# Patient Record
Sex: FEMALE | Race: WHITE | URBAN - NONMETROPOLITAN AREA
[De-identification: names, ages, dates, MRNs, and addresses within clinical notes are randomized per-mention and may not be internally consistent; named-entity substitution may affect disease eponyms.]

---

## 2020-05-29 ENCOUNTER — IMPORTED ENCOUNTER (OUTPATIENT)
Dept: URBAN - NONMETROPOLITAN AREA CLINIC 1 | Facility: CLINIC | Age: 45
End: 2020-05-29

## 2020-05-29 PROBLEM — H25.13: Noted: 2020-05-29

## 2020-05-29 PROBLEM — H35.3130: Noted: 2020-05-29

## 2020-05-29 PROBLEM — E11.9: Noted: 2020-05-29

## 2020-05-29 PROBLEM — Z01.01: Noted: 2020-05-29

## 2020-05-29 PROBLEM — H52.13: Noted: 2020-05-29

## 2020-05-29 PROCEDURE — 92015 DETERMINE REFRACTIVE STATE: CPT

## 2020-05-29 PROCEDURE — 92004 COMPRE OPH EXAM NEW PT 1/>: CPT

## 2020-05-29 NOTE — PATIENT DISCUSSION
Myopia OU Discussed w/ patient DM s DR-Stressed the importance of keeping blood sugars under control blood pressure under control and weight normalization and regular visits with PCP. -Explained the possible effects of poorly controlled diabetes and the damage that diabetes can cause to ocular health. - Pt states that she does not check BS daily - Pt states that blood work was completed day before this appt unsure of results of A1C level. -Patient to check HgbA1C.-Pt instructed to contact our office with any vision changes. AMD - dry OU -Explained dry AMD and advised that there are no treatments available at this time.-Continue AREDS 2 MVT. - Order OCT MAC for next visit -Continue Amsler grid monitoring daily. Pt is to contact our office if any changes are noted. Cataract OU-Not yet surgical. -Reviewed symptoms of advancing cataract growth such as glare and halos and decreased vision.-Continue to monitor for now. Pt will notify us if any new symptoms develop. RTC 1 year CEE w/ OCT MAC

## 2021-09-28 ENCOUNTER — IMPORTED ENCOUNTER (OUTPATIENT)
Dept: URBAN - NONMETROPOLITAN AREA CLINIC 1 | Facility: CLINIC | Age: 46
End: 2021-09-28

## 2021-09-28 PROBLEM — E11.9: Noted: 2020-05-29

## 2021-09-28 PROBLEM — H52.223: Noted: 2021-09-28

## 2021-09-28 PROBLEM — H52.4: Noted: 2021-09-28

## 2021-09-28 PROCEDURE — 92015 DETERMINE REFRACTIVE STATE: CPT

## 2021-09-28 PROCEDURE — 92014 COMPRE OPH EXAM EST PT 1/>: CPT

## 2021-09-28 NOTE — PATIENT DISCUSSION
Simple Astigmatism OU w/Presbyopia-  discussed findings w/patient-  new spectacle Rx issued-  continue to monitor yearly or prnType 2 DM w/o Retinopathy OU-  discussed findings w/patient-  Stressed the importance of keeping blood sugars under control blood pressure under control and weight normalization and regular visits with PCP.-  Explained the possible effects of poorly controlled diabetes and the damage that diabetes can cause to ocular health. -  Send notes to Dennis Lara-  Pt instructed to contact our office with any vision changes.; 's Notes: MR 9/28/2021DFE 9/28/2021

## 2022-04-09 ASSESSMENT — VISUAL ACUITY
OD_SC: 20/20
OU_SC: 20/20
OU_CC: 20/20
OS_CC: 20/20
OD_CC: 20/25
OS_SC: 20/20

## 2022-04-09 ASSESSMENT — TONOMETRY
OD_IOP_MMHG: 18
OS_IOP_MMHG: 17
OS_IOP_MMHG: 17
OD_IOP_MMHG: 19

## 2023-10-10 ENCOUNTER — COMPREHENSIVE EXAM (OUTPATIENT)
Dept: URBAN - NONMETROPOLITAN AREA CLINIC 4 | Facility: CLINIC | Age: 48
End: 2023-10-10

## 2023-10-10 DIAGNOSIS — H16.223: ICD-10-CM

## 2023-10-10 DIAGNOSIS — E11.9: ICD-10-CM

## 2023-10-10 DIAGNOSIS — H52.223: ICD-10-CM

## 2023-10-10 PROCEDURE — S0621 ROUTINE OPHTHALMOLOGICAL EXA: HCPCS

## 2023-10-10 ASSESSMENT — TONOMETRY
OS_IOP_MMHG: 19
OD_IOP_MMHG: 19

## 2023-10-10 ASSESSMENT — VISUAL ACUITY
OD_SC: 20/25
OS_SC: 20/25-2

## 2024-11-19 ENCOUNTER — COMPREHENSIVE EXAM (OUTPATIENT)
Dept: URBAN - NONMETROPOLITAN AREA CLINIC 4 | Facility: CLINIC | Age: 49
End: 2024-11-19

## 2024-11-19 DIAGNOSIS — H52.223: ICD-10-CM

## 2024-11-19 DIAGNOSIS — H16.223: ICD-10-CM

## 2024-11-19 DIAGNOSIS — E11.9: ICD-10-CM

## 2024-11-19 PROCEDURE — 92014 COMPRE OPH EXAM EST PT 1/>: CPT

## 2024-11-19 PROCEDURE — 92015 DETERMINE REFRACTIVE STATE: CPT
